# Patient Record
Sex: MALE | Race: BLACK OR AFRICAN AMERICAN | NOT HISPANIC OR LATINO | Employment: UNEMPLOYED | URBAN - METROPOLITAN AREA
[De-identification: names, ages, dates, MRNs, and addresses within clinical notes are randomized per-mention and may not be internally consistent; named-entity substitution may affect disease eponyms.]

---

## 2023-05-03 ENCOUNTER — HOSPITAL ENCOUNTER (EMERGENCY)
Facility: HOSPITAL | Age: 43
Discharge: HOME | End: 2023-05-03
Attending: EMERGENCY MEDICINE
Payer: MEDICAID

## 2023-05-03 VITALS
HEIGHT: 74 IN | HEART RATE: 75 BPM | OXYGEN SATURATION: 96 % | SYSTOLIC BLOOD PRESSURE: 156 MMHG | WEIGHT: 220 LBS | TEMPERATURE: 98 F | BODY MASS INDEX: 28.23 KG/M2 | RESPIRATION RATE: 18 BRPM | DIASTOLIC BLOOD PRESSURE: 81 MMHG

## 2023-05-03 DIAGNOSIS — M54.32 SCIATICA OF LEFT SIDE: Primary | ICD-10-CM

## 2023-05-03 PROCEDURE — 63600175 PHARM REV CODE 636 W HCPCS: Performed by: PHYSICIAN ASSISTANT

## 2023-05-03 PROCEDURE — 25000003 PHARM REV CODE 250: Performed by: PHYSICIAN ASSISTANT

## 2023-05-03 PROCEDURE — 96372 THER/PROPH/DIAG INJ SC/IM: CPT | Performed by: PHYSICIAN ASSISTANT

## 2023-05-03 PROCEDURE — 99284 EMERGENCY DEPT VISIT MOD MDM: CPT

## 2023-05-03 RX ORDER — LIDOCAINE 50 MG/G
1 PATCH TOPICAL
Status: DISCONTINUED | OUTPATIENT
Start: 2023-05-03 | End: 2023-05-03 | Stop reason: HOSPADM

## 2023-05-03 RX ORDER — LIDOCAINE 50 MG/G
1 PATCH TOPICAL DAILY
Qty: 15 PATCH | Refills: 0 | Status: SHIPPED | OUTPATIENT
Start: 2023-05-03 | End: 2023-05-18

## 2023-05-03 RX ORDER — ACETAMINOPHEN 500 MG
500 TABLET ORAL EVERY 4 HOURS PRN
Qty: 20 TABLET | Refills: 0 | Status: SHIPPED | OUTPATIENT
Start: 2023-05-03 | End: 2023-05-08

## 2023-05-03 RX ORDER — ACETAMINOPHEN 500 MG
500 TABLET ORAL
Status: COMPLETED | OUTPATIENT
Start: 2023-05-03 | End: 2023-05-03

## 2023-05-03 RX ORDER — OXYCODONE HYDROCHLORIDE 5 MG/1
5 TABLET ORAL EVERY 4 HOURS PRN
Qty: 15 TABLET | Refills: 0 | Status: SHIPPED | OUTPATIENT
Start: 2023-05-03 | End: 2023-05-03 | Stop reason: CLARIF

## 2023-05-03 RX ORDER — PANTOPRAZOLE SODIUM 40 MG/1
40 TABLET, DELAYED RELEASE ORAL
COMMUNITY
Start: 2023-04-20

## 2023-05-03 RX ORDER — DEXAMETHASONE SODIUM PHOSPHATE 4 MG/ML
6 INJECTION, SOLUTION INTRA-ARTICULAR; INTRALESIONAL; INTRAMUSCULAR; INTRAVENOUS; SOFT TISSUE
Status: COMPLETED | OUTPATIENT
Start: 2023-05-03 | End: 2023-05-03

## 2023-05-03 RX ORDER — OXYCODONE HYDROCHLORIDE 5 MG/1
5 CAPSULE ORAL EVERY 4 HOURS PRN
Qty: 15 CAPSULE | Refills: 0 | Status: SHIPPED | OUTPATIENT
Start: 2023-05-03 | End: 2023-05-03 | Stop reason: CLARIF

## 2023-05-03 RX ORDER — LISINOPRIL AND HYDROCHLOROTHIAZIDE 10; 12.5 MG/1; MG/1
TABLET ORAL
COMMUNITY

## 2023-05-03 RX ORDER — HYDROMORPHONE HYDROCHLORIDE 1 MG/ML
1 INJECTION, SOLUTION INTRAMUSCULAR; INTRAVENOUS; SUBCUTANEOUS
Status: COMPLETED | OUTPATIENT
Start: 2023-05-03 | End: 2023-05-03

## 2023-05-03 RX ORDER — ATORVASTATIN CALCIUM 20 MG/1
20 TABLET, FILM COATED ORAL
COMMUNITY
Start: 2023-04-11

## 2023-05-03 RX ORDER — ACETAMINOPHEN 500 MG
500 TABLET ORAL EVERY 4 HOURS PRN
Qty: 20 TABLET | Refills: 0 | Status: SHIPPED | OUTPATIENT
Start: 2023-05-03 | End: 2023-05-03 | Stop reason: SDUPTHER

## 2023-05-03 RX ORDER — OXYCODONE HYDROCHLORIDE 5 MG/1
5 TABLET ORAL EVERY 4 HOURS PRN
Qty: 15 TABLET | Refills: 0 | Status: SHIPPED | OUTPATIENT
Start: 2023-05-03 | End: 2023-05-06

## 2023-05-03 RX ADMIN — DEXAMETHASONE SODIUM PHOSPHATE 6 MG: 4 INJECTION, SOLUTION INTRA-ARTICULAR; INTRALESIONAL; INTRAMUSCULAR; INTRAVENOUS; SOFT TISSUE at 08:05

## 2023-05-03 RX ADMIN — LIDOCAINE 1 PATCH: 50 PATCH TOPICAL at 08:05

## 2023-05-03 RX ADMIN — ACETAMINOPHEN 500 MG: 500 TABLET, FILM COATED ORAL at 08:05

## 2023-05-03 RX ADMIN — HYDROMORPHONE HYDROCHLORIDE 1 MG: 1 INJECTION, SOLUTION INTRAMUSCULAR; INTRAVENOUS; SUBCUTANEOUS at 08:05

## 2023-05-03 NOTE — Clinical Note
"Micky"Rad Tomlinson was seen and treated in our emergency department on 5/3/2023.  He may return to work on 05/08/2023.       If you have any questions or concerns, please don't hesitate to call.      Ila Tavarez PA-C"

## 2023-05-04 NOTE — DISCHARGE INSTRUCTIONS

## 2023-05-04 NOTE — ED PROVIDER NOTES
Encounter Date: 5/3/2023       History     Chief Complaint   Patient presents with    Sciatica     Pt presents to the ED with c/o left-sided sciatic pain since this AM. States he was bending over to put his pants on when the flair began. Took aleve and celebrex at approx 1845 with no relief. States pain/numbness radiates down the leg and into the foot.      Chief complaint: Sciatica    HPI:   42-year-old male presenting for constant severe 10/10 left lumbar back pain radiating down the left lower extremity.  He reports associated numbness and tingling that radiates down the left lower extremity.  He reports symptoms began when he leaned forward to put on his pants.  He states it has been approximately 10 years since his last exacerbation of sciatica.  He attempted treatment with aleve and celebrex prior to arrival. Worse with movement  Denies new falls or trauma, fever, chills, dysuria, hematuria urgency or frequency, weakness abdominal pain bowel or bladder incontinence, saddle anesthesia         The history is provided by the patient.   Review of patient's allergies indicates:  No Known Allergies  Past Medical History:   Diagnosis Date    GERD (gastroesophageal reflux disease)     Hyperlipidemia     Hypertension      History reviewed. No pertinent surgical history.  History reviewed. No pertinent family history.  Social History     Tobacco Use    Smoking status: Never    Smokeless tobacco: Never   Substance Use Topics    Alcohol use: Yes    Drug use: Never     Review of Systems   Constitutional:  Negative for chills and fever.   HENT:  Negative for congestion, ear pain, rhinorrhea and sore throat.    Eyes:  Negative for redness.   Respiratory:  Negative for shortness of breath and stridor.    Cardiovascular:  Negative for chest pain.   Gastrointestinal:  Negative for abdominal pain, constipation, diarrhea, nausea and vomiting.   Genitourinary:  Negative for dysuria, frequency, hematuria and urgency.    Musculoskeletal:  Positive for back pain. Negative for neck pain.   Skin:  Negative for rash.   Neurological:  Negative for dizziness, speech difficulty, weakness, light-headedness and numbness.   Hematological:  Does not bruise/bleed easily.   Psychiatric/Behavioral:  Negative for confusion.      Physical Exam     Initial Vitals [05/03/23 1927]   BP Pulse Resp Temp SpO2   (!) 178/104 95 20 98.4 °F (36.9 °C) 96 %      MAP       --         Physical Exam    Nursing note and vitals reviewed.  Constitutional: He appears well-developed and well-nourished. No distress.   HENT:   Head: Normocephalic.   Right Ear: External ear normal.   Left Ear: External ear normal.   Eyes: Conjunctivae are normal.   Cardiovascular:  Normal rate and regular rhythm.     Exam reveals no gallop and no friction rub.       No murmur heard.  Pulses:       Dorsalis pedis pulses are 2+ on the right side and 2+ on the left side.   Pulmonary/Chest: No respiratory distress. He has no wheezes. He has no rhonchi. He has no rales.   Abdominal: Abdomen is soft. He exhibits no distension. There is no abdominal tenderness. There is no rebound and no guarding.   Musculoskeletal:         General: Normal range of motion.      Comments: Appears uncomfortable due to pain.  Laying on right side Ambulatory with slow antalgic gait.  Normal strength bilateral lower extremities.  Negative straight leg raise test bilaterally.  Tenderness over the left lumbar paraspinous musculature.       Neurological: He is alert. No sensory deficit.   Skin: Skin is warm and dry. No rash noted.   Psychiatric: He has a normal mood and affect. His behavior is normal. Judgment and thought content normal.       ED Course   Procedures  Labs Reviewed - No data to display       Imaging Results              X-Ray Lumbar Spine Ap And Lateral (Final result)  Result time 05/03/23 20:15:37      Final result by Ben Hoover MD (05/03/23 20:15:37)                   Impression:      No  acute lumbar spine abnormalities identified.      Electronically signed by: Ben Hoover MD  Date:    05/03/2023  Time:    20:15               Narrative:    EXAMINATION:  XR LUMBAR SPINE AP AND LATERAL    CLINICAL HISTORY:  back pain;    TECHNIQUE:  AP, lateral and spot images were performed of the lumbar spine.    COMPARISON:  None    FINDINGS:  Lumbar spine alignment is within normal limits.  No evidence of acute lumbar spine fracture or subluxation.  Intervertebral disc space narrowing is visualized at the L4-5 and L5-S1 levels, more pronounced at L5-S1 with mild degenerative endplate sclerosis.  Visualized sacrum is unremarkable.                                       Medications   LIDOcaine 5 % patch 1 patch (1 patch Transdermal Patch Applied 5/3/23 2047)   HYDROmorphone injection 1 mg (1 mg Intramuscular Given 5/3/23 2030)   acetaminophen tablet 500 mg (500 mg Oral Given 5/3/23 2031)   dexAMETHasone injection 6 mg (6 mg Intramuscular Given 5/3/23 2046)     Medical Decision Making:   Clinical Tests:   Lab Tests: Ordered and Reviewed  ED Management:  42-year-old male presenting for left lumbar back pain radiating down the left lower extremity.  Exam above.  X-ray number spine with arthritic changes on independent interpretation at L4-L5 and l5 S1.  No fracture dislocation.    Dilaudid IM given.  Considered doubt cauda equina, epidural abscess or spinal cord compression at this time.  No neurovascular deficits.  Abdomen soft nontender.  The patient follow up with Spine.  Return ER for worsening as needed                        Clinical Impression:   Final diagnoses:  [M54.32] Sciatica of left side (Primary)        ED Disposition Condition    Discharge Stable          ED Prescriptions       Medication Sig Dispense Start Date End Date Auth. Provider    acetaminophen (TYLENOL) 500 MG tablet  (Status: Discontinued) Take 1 tablet (500 mg total) by mouth every 4 (four) hours as needed. 20 tablet 5/3/2023 5/3/2023  Ila Tavarez PA-C    oxyCODONE (ROXICODONE) 5 MG immediate release tablet  (Status: Discontinued) Take 1 tablet (5 mg total) by mouth every 4 (four) hours as needed for Pain. 15 tablet 5/3/2023 5/3/2023 Ila Tavarez PA-C    LIDOcaine (LIDODERM) 5 % Place 1 patch onto the skin once daily. Remove & Discard patch within 12 hours or as directed by MD. May use 4% over the counter if not covered by insurance for 15 days 15 patch 5/3/2023 5/18/2023 Ila Tavarez PA-C    acetaminophen (TYLENOL) 500 MG tablet Take 1 tablet (500 mg total) by mouth every 4 (four) hours as needed. 20 tablet 5/3/2023 5/8/2023 Ila Tavarez PA-C    oxyCODONE (ROXICODONE) 5 MG immediate release tablet  (Status: Discontinued) Take 1 tablet (5 mg total) by mouth every 4 (four) hours as needed for Pain. 15 tablet 5/3/2023 5/3/2023 Ila Tavarez PA-C    oxyCODONE (OXY-IR) 5 mg Cap  (Status: Discontinued) Take 1 capsule (5 mg total) by mouth every 4 (four) hours as needed for Pain. 15 capsule 5/3/2023 5/3/2023 Ila Tavarez PA-C    oxyCODONE (ROXICODONE) 5 MG immediate release tablet  (Status: Discontinued) Take 1 tablet (5 mg total) by mouth every 4 (four) hours as needed for Pain. 15 tablet 5/3/2023 5/3/2023 Ila Tavarez PA-C    oxyCODONE (ROXICODONE) 5 MG immediate release tablet Take 1 tablet (5 mg total) by mouth every 4 (four) hours as needed for Pain. 15 tablet 5/3/2023 5/6/2023 Ila Tavarez PA-C          Follow-up Information       Follow up With Specialties Details Why Contact Info Additional Information    [facility] Schedule an appointment as soon as possible for a visit in 2 days for follow up [address] [facility] [address]  [phone]       [facility] [facility] Schedule an appointment as soon as possible for a visit in 2 days for follow up [facility]  [facility]  [address]  [phone] [facility] - Main Building, 5th Floor Please park in [address] and use Atrium elevator    [facility] [place] - Emergency Dept Emergency Medicine Go to  As needed, If symptoms worsen [address] [place] [id number] [phone]              Ila Tavarez PA-C  05/03/23 6950

## 2023-05-17 ENCOUNTER — TELEPHONE (OUTPATIENT)
Dept: PHYSICAL MEDICINE AND REHAB | Facility: CLINIC | Age: 43
End: 2023-05-17
Payer: MEDICAID

## 2023-05-17 NOTE — TELEPHONE ENCOUNTER
Reached out to patient in regards to scheduled appt with Dr. Chand. Patient was incorrectly scheduled, he should have been scheduled with [facility] for sciatica. Patient has Medicaid and is a new patient. Due to limited access for Medicaid patients, I explained that we are currently not accepting patients with Medicaid.  Provided Medicaid Escalation number to patient, and they v/u.    Quiana Coy RN

## 2024-09-23 ENCOUNTER — HOSPITAL ENCOUNTER (EMERGENCY)
Facility: HOSPITAL | Age: 44
Discharge: HOME | End: 2024-09-23
Attending: EMERGENCY MEDICINE
Payer: MEDICAID

## 2024-09-23 VITALS
BODY MASS INDEX: 27.59 KG/M2 | OXYGEN SATURATION: 96 % | RESPIRATION RATE: 20 BRPM | HEART RATE: 76 BPM | SYSTOLIC BLOOD PRESSURE: 121 MMHG | DIASTOLIC BLOOD PRESSURE: 70 MMHG | HEIGHT: 74 IN | WEIGHT: 215 LBS | TEMPERATURE: 98 F

## 2024-09-23 DIAGNOSIS — T78.3XXA ANGIOEDEMA, INITIAL ENCOUNTER: Primary | ICD-10-CM

## 2024-09-23 LAB
ALBUMIN SERPL BCP-MCNC: 3.5 G/DL (ref 3.5–5.2)
ALP SERPL-CCNC: 61 U/L (ref 55–135)
ALT SERPL W/O P-5'-P-CCNC: 25 U/L (ref 10–44)
ANION GAP SERPL CALC-SCNC: 16 MMOL/L (ref 8–16)
AST SERPL-CCNC: 19 U/L (ref 10–40)
BASOPHILS # BLD AUTO: 0.01 K/UL (ref 0–0.2)
BASOPHILS NFR BLD: 0.1 % (ref 0–1.9)
BILIRUB SERPL-MCNC: 0.7 MG/DL (ref 0.1–1)
BUN SERPL-MCNC: 22 MG/DL (ref 6–20)
C4 SERPL-MCNC: 38 MG/DL (ref 11–44)
CALCIUM SERPL-MCNC: 10.1 MG/DL (ref 8.7–10.5)
CHLORIDE SERPL-SCNC: 99 MMOL/L (ref 95–110)
CO2 SERPL-SCNC: 22 MMOL/L (ref 23–29)
CREAT SERPL-MCNC: 1.3 MG/DL (ref 0.5–1.4)
DIFFERENTIAL METHOD BLD: ABNORMAL
EOSINOPHIL # BLD AUTO: 0.2 K/UL (ref 0–0.5)
EOSINOPHIL NFR BLD: 1.8 % (ref 0–8)
ERYTHROCYTE [DISTWIDTH] IN BLOOD BY AUTOMATED COUNT: 11.7 % (ref 11.5–14.5)
EST. GFR  (NO RACE VARIABLE): >60 ML/MIN/1.73 M^2
GLUCOSE SERPL-MCNC: 176 MG/DL (ref 70–110)
HCT VFR BLD AUTO: 35.5 % (ref 40–54)
HGB BLD-MCNC: 11.9 G/DL (ref 14–18)
IMM GRANULOCYTES # BLD AUTO: 0.03 K/UL (ref 0–0.04)
IMM GRANULOCYTES NFR BLD AUTO: 0.4 % (ref 0–0.5)
LYMPHOCYTES # BLD AUTO: 2.8 K/UL (ref 1–4.8)
LYMPHOCYTES NFR BLD: 33.8 % (ref 18–48)
MAGNESIUM SERPL-MCNC: 2.1 MG/DL (ref 1.6–2.6)
MCH RBC QN AUTO: 30.6 PG (ref 27–31)
MCHC RBC AUTO-ENTMCNC: 33.5 G/DL (ref 32–36)
MCV RBC AUTO: 91 FL (ref 82–98)
MONOCYTES # BLD AUTO: 0.5 K/UL (ref 0.3–1)
MONOCYTES NFR BLD: 5.6 % (ref 4–15)
NEUTROPHILS # BLD AUTO: 4.9 K/UL (ref 1.8–7.7)
NEUTROPHILS NFR BLD: 58.3 % (ref 38–73)
NRBC BLD-RTO: 0 /100 WBC
PLATELET # BLD AUTO: 555 K/UL (ref 150–450)
PMV BLD AUTO: 9.4 FL (ref 9.2–12.9)
POTASSIUM SERPL-SCNC: 3.9 MMOL/L (ref 3.5–5.1)
PROT SERPL-MCNC: 9 G/DL (ref 6–8.4)
RBC # BLD AUTO: 3.89 M/UL (ref 4.6–6.2)
SODIUM SERPL-SCNC: 137 MMOL/L (ref 136–145)
WBC # BLD AUTO: 8.35 K/UL (ref 3.9–12.7)

## 2024-09-23 PROCEDURE — 83520 IMMUNOASSAY QUANT NOS NONAB: CPT | Performed by: EMERGENCY MEDICINE

## 2024-09-23 PROCEDURE — 96365 THER/PROPH/DIAG IV INF INIT: CPT

## 2024-09-23 PROCEDURE — 83735 ASSAY OF MAGNESIUM: CPT | Performed by: PHYSICIAN ASSISTANT

## 2024-09-23 PROCEDURE — 85025 COMPLETE CBC W/AUTO DIFF WBC: CPT | Performed by: PHYSICIAN ASSISTANT

## 2024-09-23 PROCEDURE — 25000003 PHARM REV CODE 250: Performed by: PHYSICIAN ASSISTANT

## 2024-09-23 PROCEDURE — 99284 EMERGENCY DEPT VISIT MOD MDM: CPT | Mod: 25

## 2024-09-23 PROCEDURE — 80053 COMPREHEN METABOLIC PANEL: CPT | Performed by: PHYSICIAN ASSISTANT

## 2024-09-23 PROCEDURE — 63600175 PHARM REV CODE 636 W HCPCS: Performed by: PHYSICIAN ASSISTANT

## 2024-09-23 PROCEDURE — 86160 COMPLEMENT ANTIGEN: CPT | Mod: 59 | Performed by: PHYSICIAN ASSISTANT

## 2024-09-23 PROCEDURE — 86160 COMPLEMENT ANTIGEN: CPT | Performed by: PHYSICIAN ASSISTANT

## 2024-09-23 PROCEDURE — 96375 TX/PRO/DX INJ NEW DRUG ADDON: CPT

## 2024-09-23 RX ORDER — FAMOTIDINE 20 MG/1
20 TABLET, FILM COATED ORAL 2 TIMES DAILY
Qty: 20 TABLET | Refills: 0 | Status: SHIPPED | OUTPATIENT
Start: 2024-09-23 | End: 2024-10-03

## 2024-09-23 RX ORDER — DIPHENHYDRAMINE HYDROCHLORIDE 50 MG/ML
50 INJECTION INTRAMUSCULAR; INTRAVENOUS
Status: COMPLETED | OUTPATIENT
Start: 2024-09-23 | End: 2024-09-23

## 2024-09-23 RX ORDER — PREDNISONE 20 MG/1
40 TABLET ORAL DAILY
Qty: 10 TABLET | Refills: 0 | Status: SHIPPED | OUTPATIENT
Start: 2024-09-23 | End: 2024-09-28

## 2024-09-23 RX ORDER — CETIRIZINE HYDROCHLORIDE 10 MG/1
10 TABLET ORAL DAILY
Qty: 10 TABLET | Refills: 0 | Status: SHIPPED | OUTPATIENT
Start: 2024-09-23 | End: 2024-10-03

## 2024-09-23 RX ORDER — DEXAMETHASONE SODIUM PHOSPHATE 4 MG/ML
12 INJECTION, SOLUTION INTRA-ARTICULAR; INTRALESIONAL; INTRAMUSCULAR; INTRAVENOUS; SOFT TISSUE
Status: COMPLETED | OUTPATIENT
Start: 2024-09-23 | End: 2024-09-23

## 2024-09-23 RX ORDER — AMLODIPINE BESYLATE 5 MG/1
5 TABLET ORAL DAILY
Qty: 30 TABLET | Refills: 0 | Status: SHIPPED | OUTPATIENT
Start: 2024-09-23 | End: 2024-10-23

## 2024-09-23 RX ORDER — EPINEPHRINE 0.3 MG/.3ML
1 INJECTION SUBCUTANEOUS
Qty: 1 EACH | Refills: 1 | Status: SHIPPED | OUTPATIENT
Start: 2024-09-23 | End: 2025-09-23

## 2024-09-23 RX ORDER — FAMOTIDINE 10 MG/ML
20 INJECTION INTRAVENOUS
Status: COMPLETED | OUTPATIENT
Start: 2024-09-23 | End: 2024-09-23

## 2024-09-23 RX ADMIN — DEXAMETHASONE SODIUM PHOSPHATE 12 MG: 4 INJECTION INTRA-ARTICULAR; INTRALESIONAL; INTRAMUSCULAR; INTRAVENOUS; SOFT TISSUE at 04:09

## 2024-09-23 RX ADMIN — TRANEXAMIC ACID 1000 MG: 100 INJECTION, SOLUTION INTRAVENOUS at 04:09

## 2024-09-23 RX ADMIN — DIPHENHYDRAMINE HYDROCHLORIDE 50 MG: 50 INJECTION INTRAMUSCULAR; INTRAVENOUS at 04:09

## 2024-09-23 RX ADMIN — FAMOTIDINE 20 MG: 10 INJECTION, SOLUTION INTRAVENOUS at 04:09

## 2024-09-23 NOTE — ED PROVIDER NOTES
Encounter Date: 9/23/2024       History     Chief Complaint   Patient presents with    Oral Swelling     Since 4:30 yesterday, slight swelling to left side of mouth, more swelling noted to cheek, takes lisinopril yesterday     42yo M presents to ED with chief complaint atraumatic swelling to L cheek and face, to lower lips.    Pt first felt abnormal sensation to L sided mouth around 4:30pm today while leaving local football game; friends noticed swelling to the area, pt palpated a small area of swelling to the area. No tenderness. No pruritus. Took Benadryl around 6pm, took a nap, woke around 10pm with worsening swelling to L cheek/face, now with swelling to L sided lower lip. Took his Lisinopril around 1am. Has experienced worsening swelling to L cheek, now to entirety of lower lip since this evening, prompting ED visit.  States did experience cough after waking, none since then.  No chest pain or shortness of breath.  No abdominal pain.  No nausea vomiting.  No wheezing.  Does admit to frequently clearing his throat since onset of symptoms.  No rash.  No pruritus.  No odontalgia.  No hoarse phonation.  No trouble with secretions.  Denies pain.  No tenderness.  Denies history of similar symptoms.  Denies history of anaphylaxis.    No personal or family history of hereditary angioedema.  Has been on lisinopril for 10 years.    Daily rx: lisinopril, statin, pantoprazole, multivitamin    PMH:  HTN  HLD  Obesity  GERD      Review of patient's allergies indicates:   Allergen Reactions    Ace inhibitors Edema     Past Medical History:   Diagnosis Date    GERD (gastroesophageal reflux disease)     Hyperlipidemia     Hypertension      History reviewed. No pertinent surgical history.  No family history on file.  Social History     Tobacco Use    Smoking status: Never    Smokeless tobacco: Never   Substance Use Topics    Alcohol use: Yes    Drug use: Never     Review of Systems   Constitutional:  Negative for chills and  fever.   HENT:  Positive for facial swelling. Negative for sore throat, trouble swallowing and voice change.    Respiratory:  Positive for cough. Negative for chest tightness and shortness of breath.    Cardiovascular:  Negative for chest pain.   Gastrointestinal:  Negative for abdominal pain, nausea and vomiting.   Skin:  Negative for rash.   Neurological:  Negative for syncope.       Physical Exam     Initial Vitals [09/23/24 0349]   BP Pulse Resp Temp SpO2   (!) 138/90 100 16 98 °F (36.7 °C) 98 %      MAP       --         Physical Exam    Nursing note and vitals reviewed.  Constitutional: He appears well-developed and well-nourished. He is not diaphoretic. No distress.   HENT:   Head: Normocephalic and atraumatic.   Mild soft edema to L sided face/cheek, no overlying skin changes, no ttp. Oropharynx grossly unremarkable--no perialveolar swelling or mass, sublingual area soft. No tongue edema, oropharynx widely patent, mucous membranes moist, normal phonation. Mild edema noted to entirety of lower lip.    Neck: Neck supple.   Normal range of motion.  Pulmonary/Chest: Breath sounds normal. No stridor. No respiratory distress.   Abdominal: There is no abdominal tenderness.   Musculoskeletal:         General: No tenderness. Normal range of motion.      Cervical back: Normal range of motion and neck supple.     Neurological: He is alert and oriented to person, place, and time.   Skin: Skin is warm.   Psychiatric: He has a normal mood and affect. Thought content normal.         ED Course   Procedures  Labs Reviewed   CBC W/ AUTO DIFFERENTIAL - Abnormal       Result Value    WBC 8.35      RBC 3.89 (*)     Hemoglobin 11.9 (*)     Hematocrit 35.5 (*)     MCV 91      MCH 30.6      MCHC 33.5      RDW 11.7      Platelets 555 (*)     MPV 9.4      Immature Granulocytes 0.4      Gran # (ANC) 4.9      Immature Grans (Abs) 0.03      Lymph # 2.8      Mono # 0.5      Eos # 0.2      Baso # 0.01      nRBC 0      Gran % 58.3       Lymph % 33.8      Mono % 5.6      Eosinophil % 1.8      Basophil % 0.1      Differential Method Automated     COMPREHENSIVE METABOLIC PANEL - Abnormal    Sodium 137      Potassium 3.9      Chloride 99      CO2 22 (*)     Glucose 176 (*)     BUN 22 (*)     Creatinine 1.3      Calcium 10.1      Total Protein 9.0 (*)     Albumin 3.5      Total Bilirubin 0.7      Alkaline Phosphatase 61      AST 19      ALT 25      eGFR >60      Anion Gap 16     MAGNESIUM    Magnesium 2.1     C1 ESTERASE INHIBITOR PANEL   C4 COMPLEMENT   TRYPTASE          Imaging Results    None          Medications   famotidine (PF) injection 20 mg (20 mg Intravenous Given 9/23/24 0415)   diphenhydrAMINE injection 50 mg (50 mg Intravenous Given 9/23/24 0410)   dexAMETHasone injection 12 mg (12 mg Intravenous Given 9/23/24 0411)   tranexamic acid (CYKLOKAPRON) 1,000 mg in 0.9% NaCl 100 mL IVPB (MB+) (0 mg Intravenous Stopped 9/23/24 0510)     Medical Decision Making  Differential diagnosis: Angioedema, anaphylaxis, local allergy, drug reaction, cellulitis, dental abscess, lisinopril induced angioedema    Amount and/or Complexity of Data Reviewed  External Data Reviewed: notes.  Labs: ordered.  Discussion of management or test interpretation with external provider(s): Initial swelling to L sided angle of mouth around 4:30pm this afternoon. Took Benadryl this evening, took a nap, woke around 10pm with L face/cheek swelling, mild L sided lower lip swelling. Worsening L sided facial swelling, now swelling to entire lower lip since this evening. On Lisinopril x years. No hx anaphylaxis. No known family hx HAE.  Clearing throat, transient cough, no stridor, no wheeze, no trouble with secretions, normal phonation, no abdominal pain, no nausea vomiting, no pruritus, no rash.  Given H1, H2 blockers, as well as corticosteroids; given TXA due to suspected bradykinin-mediated angioedema.  Plan to monitor for 4 hours.    Risk  OTC drugs.  Prescription drug  management.               ED Course as of 09/23/24 0638   Mon Sep 23, 2024   0445 Stage I ishoo staging of angioedema.  []   0546 Repeat exam without worsening, patient denies worsening of symptoms, no new complaints, will continue to monitor. [SM]   0637 Repeat evaluation without worsening swelling.  Plan to monitor until 8am.  Handed off to oncoming provider.  Referral to allergy placed.  Advised to discontinue lisinopril.  Given H1, H2, corticosteroids on discharge, although discussed need for prompt return if worsening swelling despite current treatment as this is likely not histamine-related.  Discussed need for prompt return if any worsening symptoms, emergent EpiPen use, etc.. [SM]      ED Course User Index  [SM] Tommy Raygoza PA-C                           Clinical Impression:  Final diagnoses:  [T78.3XXA] Angioedema, initial encounter (Primary)                      Tommy Raygoza PA-C  09/23/24 0638

## 2024-09-23 NOTE — DISCHARGE INSTRUCTIONS
Stop taking lisinopril.  Follow-up with a primary care provider for re-evaluation of elevated blood pressure, for replacement medication.    Begin taking Zyrtec once daily.  Begin taking Pepcid twice daily.  Take the prednisone daily as written.  Benadryl as needed for continued swelling.  Remember to use the EpiPen if you are unable to return to this ED in case of emergency.    Please follow-up with a local allergist using information provided.  Please contact your primary care provider for follow-up and repeat evaluation.    Return to this ED if you develop chest tightness, shortness of breath, severe cough, wheezing, tongue swelling, abdominal pain with nausea vomiting, change in your voice or hoarse voice, painful or difficulty swallowing, if any problems occur.

## 2024-09-23 NOTE — ED NOTES
Report received. Pt resting on stretcher with no distress noted. Pt denies any worsening swelling or SOB. Updated on plan of care, verbalized understanding.

## 2024-09-24 LAB — TRYPTASE LEVEL: 1.4 NG/ML
